# Patient Record
Sex: MALE
[De-identification: names, ages, dates, MRNs, and addresses within clinical notes are randomized per-mention and may not be internally consistent; named-entity substitution may affect disease eponyms.]

---

## 2019-01-30 PROBLEM — Z00.00 ENCOUNTER FOR PREVENTIVE HEALTH EXAMINATION: Status: ACTIVE | Noted: 2019-01-30

## 2019-02-07 ENCOUNTER — APPOINTMENT (OUTPATIENT)
Dept: NEUROLOGY | Facility: CLINIC | Age: 49
End: 2019-02-07
Payer: COMMERCIAL

## 2019-02-07 VITALS
SYSTOLIC BLOOD PRESSURE: 135 MMHG | TEMPERATURE: 98.4 F | OXYGEN SATURATION: 96 % | WEIGHT: 200 LBS | HEIGHT: 73 IN | HEART RATE: 86 BPM | DIASTOLIC BLOOD PRESSURE: 88 MMHG | BODY MASS INDEX: 26.51 KG/M2

## 2019-02-07 DIAGNOSIS — R41.89 OTHER SYMPTOMS AND SIGNS INVOLVING COGNITIVE FUNCTIONS AND AWARENESS: ICD-10-CM

## 2019-02-07 DIAGNOSIS — F32.9 MAJOR DEPRESSIVE DISORDER, SINGLE EPISODE, UNSPECIFIED: ICD-10-CM

## 2019-02-07 DIAGNOSIS — Z86.69 PERSONAL HISTORY OF OTHER DISEASES OF THE NERVOUS SYSTEM AND SENSE ORGANS: ICD-10-CM

## 2019-02-07 DIAGNOSIS — G20 PARKINSON'S DISEASE: ICD-10-CM

## 2019-02-07 DIAGNOSIS — Z78.9 OTHER SPECIFIED HEALTH STATUS: ICD-10-CM

## 2019-02-07 PROCEDURE — 95972 ALYS CPLX SP/PN NPGT W/PRGRM: CPT

## 2019-02-07 PROCEDURE — 99204 OFFICE O/P NEW MOD 45 MIN: CPT | Mod: 25

## 2019-02-07 RX ORDER — ESCITALOPRAM OXALATE 20 MG/1
20 TABLET, FILM COATED ORAL DAILY
Refills: 0 | Status: ACTIVE | COMMUNITY
Start: 2019-02-07

## 2019-02-08 PROBLEM — G20 PARKINSON DISEASE: Status: ACTIVE | Noted: 2019-02-07

## 2019-02-08 PROBLEM — F32.9 DEPRESSION: Status: ACTIVE | Noted: 2019-02-07

## 2019-02-08 PROBLEM — R41.89 COGNITIVE IMPAIRMENT: Status: ACTIVE | Noted: 2019-02-07

## 2019-02-08 NOTE — DISCUSSION/SUMMARY
[FreeTextEntry1] : YOPD with b/l STN DBS who is mildly more bradykinetic today due to reductions in levodopa. Adjustments to DBS were well tolerated and beneficial. Will leave his meds unchanged. Discussed a trial of xadago in the future if his bradykinesia increases. \par \par RTC 4 - 6months

## 2019-02-08 NOTE — HISTORY OF PRESENT ILLNESS
[FreeTextEntry1] : Patient with YOPD s/p b.l STN DBS. He had RC units placed in 9/2017.  Since his last visit, he tapered down on sinemet to BID and started exelon TD. He feels that his thinking is clearer since starting exelon.  His speech has not worsened. He feels mildly slower with the medication changes, but has not had any falls. He occasionally has FOG. His wife reports mild tremors in his hands. Does not have LID. \par \par Nonmotor\par Sleep is fragmented which he relates to stress\par He does not exercise\par Denies any constipation\par \par PD meds\par sinemet 25/100 2 tabs BID\par exelon 4.5mg TD\par

## 2019-06-06 ENCOUNTER — APPOINTMENT (OUTPATIENT)
Dept: NEUROLOGY | Facility: CLINIC | Age: 49
End: 2019-06-06
Payer: COMMERCIAL

## 2019-06-06 VITALS
DIASTOLIC BLOOD PRESSURE: 75 MMHG | SYSTOLIC BLOOD PRESSURE: 108 MMHG | OXYGEN SATURATION: 96 % | TEMPERATURE: 98 F | HEART RATE: 89 BPM | BODY MASS INDEX: 27.96 KG/M2 | HEIGHT: 73 IN | WEIGHT: 211 LBS

## 2019-06-06 VITALS — DIASTOLIC BLOOD PRESSURE: 73 MMHG | HEART RATE: 91 BPM | SYSTOLIC BLOOD PRESSURE: 107 MMHG

## 2019-06-06 PROCEDURE — 99214 OFFICE O/P EST MOD 30 MIN: CPT | Mod: 25

## 2019-06-06 PROCEDURE — 95970 ALYS NPGT W/O PRGRMG: CPT

## 2019-06-06 RX ORDER — RIVASTIGMINE 4.6 MG/24H
4.6 PATCH, EXTENDED RELEASE TRANSDERMAL DAILY
Refills: 0 | Status: DISCONTINUED | COMMUNITY
Start: 2019-02-07 | End: 2019-06-06

## 2019-06-06 RX ORDER — SAFINAMIDE MESYLATE 100 MG/1
100 TABLET, FILM COATED ORAL DAILY
Qty: 30 | Refills: 3 | Status: DISCONTINUED | COMMUNITY
Start: 2019-02-07 | End: 2019-06-06

## 2019-06-06 RX ORDER — SAFINAMIDE MESYLATE 50 MG/1
50 TABLET, FILM COATED ORAL DAILY
Qty: 14 | Refills: 0 | Status: DISCONTINUED | COMMUNITY
Start: 2019-02-07 | End: 2019-06-06

## 2019-06-06 NOTE — PHYSICAL EXAM
[FreeTextEntry1] : There is 2+ masking. 2+ hypophonia with mild dysarthria. Tremors are absent. Rapid alternating movements are mildly slow R>L with normal tone. Walks with good SL and turns. There is no FOG or LID. Pull test is negative.

## 2019-06-06 NOTE — HISTORY OF PRESENT ILLNESS
[FreeTextEntry1] : Patient with YOPD s/p b.l STN DBS. Since his last visit, he feels that he is overall movements have been stable since the last adjustment.  He occasionally has some difficulty getting words out and feels slurred at times. Typing is normal.  He stopped the exelon patch 3 months ago due to drowsiness, which continue to bother him throughout the day. His first dose of sinemet triggers the drowsiness usually. Snores at night and has not been able to get a sleep study in the past due to lack of insurance covering it. Did not try xadago bc it was not covered. \par \par Nonmotor\par Sleep is fragmented which he relates to stress\par Denies any constipation\par \par PD meds\par sinemet 25/100 2 tabs AM and PM\par \par prior\par neupro - sleep attacks\par azilect

## 2019-06-06 NOTE — DISCUSSION/SUMMARY
[FreeTextEntry1] : PD with bilateral STN DBS who has daytime drowsiness and speech issues. \par \par Plan\par We decided to try selegiline 5mg BID\par reduce sinemet to 1-1.5tabs in AM and leave evening dose the same. Will consider adding 1 tab sinemet at noon. In the past, levodopa tended to worsen his dysarthria. \par Leave DBS unchanged\par Consider sleep study \par \par RTO 3-4 months

## 2019-06-20 ENCOUNTER — OTHER (OUTPATIENT)
Age: 49
End: 2019-06-20

## 2019-06-20 DIAGNOSIS — R53.83 OTHER FATIGUE: ICD-10-CM

## 2019-09-12 ENCOUNTER — APPOINTMENT (OUTPATIENT)
Dept: NEUROLOGY | Facility: CLINIC | Age: 49
End: 2019-09-12
Payer: COMMERCIAL

## 2019-09-12 PROCEDURE — 95983 ALYS BRN NPGT PRGRMG 15 MIN: CPT

## 2019-09-12 PROCEDURE — 99214 OFFICE O/P EST MOD 30 MIN: CPT

## 2019-09-12 RX ORDER — SELEGILINE HYDROCHLORIDE 5 MG/1
5 CAPSULE ORAL TWICE DAILY
Qty: 180 | Refills: 3 | Status: DISCONTINUED | COMMUNITY
Start: 2019-06-06 | End: 2019-09-12

## 2019-09-13 NOTE — PHYSICAL EXAM
[FreeTextEntry1] : There is 2+ masking. Speech is mildly hypophonic but fluent. EOMI. There were no rest tremors. His rapid movements decrements 1-2+ with  trace rigidity at the wrists. He arises easily and walks with slightly stooped posture. His gait is mildly slow. Turns well. recovers on pull test

## 2019-09-13 NOTE — DISCUSSION/SUMMARY
[FreeTextEntry1] : Patient with increased off time with levodopa induced speech changes. \par Daytime fatigue and brain fog present\par \par Plan\par DBS adjusted\par start xadago 50 -->100mg qd over 2 weeks\par trial of parcopa 1 tab q4hrs in place of sinemet IR. Suggested that he move his protein content to later in the day as it may be impacting sinemet kinetics. \par Will consider trial of low dose ritallin in the future\par \par he will contact me in 2-3 weeks.

## 2019-09-13 NOTE — HISTORY OF PRESENT ILLNESS
[FreeTextEntry1] : Patient with YOPD s/p b.l STN DBS. He has been walking more slowly and shuffling. He is stooped as well per his wife. He has a a few soft falls and notices occasional freezing episodes when walking through doorways. He is taking sinemet 2 tabs TID (~6hrs apart). He has some wearing offs but says that his speech can be unintelligible at times. He also feel that modafinil was not helpful for his brain fog that occurs daily. He finds that when he takes sinemet q4hrs the benefits are not realized for several days. He admits to having a meat centric diet with protein ingested for all 3 meals. \par \par \par Nonmotor\par Sleep is fragmented which he relates to stress\par Denies any constipation\par \par PD meds\par sinemet 25/100 2 tabs TID\par modafinil 200mg qAM\par \par prior\par neupro - sleep attacks\par azilect\par selegiline

## 2019-09-18 ENCOUNTER — RX RENEWAL (OUTPATIENT)
Age: 49
End: 2019-09-18

## 2019-09-18 RX ORDER — CARBIDOPA AND LEVODOPA 25; 100 MG/1; MG/1
25-100 TABLET, ORALLY DISINTEGRATING ORAL
Qty: 120 | Refills: 3 | Status: ACTIVE | COMMUNITY
Start: 2019-09-12 | End: 1900-01-01

## 2019-10-07 ENCOUNTER — OTHER (OUTPATIENT)
Age: 49
End: 2019-10-07

## 2019-12-02 ENCOUNTER — RX RENEWAL (OUTPATIENT)
Age: 49
End: 2019-12-02

## 2019-12-17 ENCOUNTER — RX RENEWAL (OUTPATIENT)
Age: 49
End: 2019-12-17

## 2020-02-06 ENCOUNTER — APPOINTMENT (OUTPATIENT)
Dept: NEUROLOGY | Facility: CLINIC | Age: 50
End: 2020-02-06
Payer: COMMERCIAL

## 2020-02-06 ENCOUNTER — APPOINTMENT (OUTPATIENT)
Dept: NEUROLOGY | Facility: CLINIC | Age: 50
End: 2020-02-06

## 2020-02-06 PROCEDURE — 99214 OFFICE O/P EST MOD 30 MIN: CPT | Mod: 25

## 2020-02-06 PROCEDURE — 95983 ALYS BRN NPGT PRGRMG 15 MIN: CPT

## 2020-02-07 NOTE — HISTORY OF PRESENT ILLNESS
[FreeTextEntry1] : Patient with YOPD s/p b.l STN DBS. He reports that speech continues to be difficult. It improves in the morning and progressively worsens as the day goes on. He is slouching more and has had several near falls and freezing episodes. The freezing occurs as her crosses doorways. \par \par Modafinil has helped his daytime fatigue, but brain fog continues to occur throughout the day. \par \par Nonmotor Sxms: \par Sleep is fragmented which he relates to stress\par Denies any constipation\par \par PD meds\par sinemet 25/100 2 tabs BID\par modafinil 200mg qAM\par \par prior\par neupro - sleep attacks\par azilect\par selegiline\par xadago\par rytary \par exelon

## 2020-02-07 NOTE — DISCUSSION/SUMMARY
[FreeTextEntry1] : Patient with increased bradykinesia responsive to DBS changes. Speech remains difficult throughout the day\par Daytime fatigue is better but brain fog has not remitted\par \par Plan\par DBS adjusted\par Will trial of nourianz 20mg x 2weeks and if no response then will increase to 40mg \par leave sinemet regimen unchanged\par d/c modafinil and do trial of ritallin 5mg qd\par increase exercising\par \par RTO 3months\par

## 2020-02-07 NOTE — PHYSICAL EXAM
[FreeTextEntry1] : There is 2+ masking. Speech is mildly hypophonic but fluent. ?lower cranial dystonia vs. stiffness of his jaw when speaking.  EOMI. There were no rest tremors. His rapid movements decrements 2+ with mild rigidity at the wrists. He arises easily and walks with slightly stooped posture. His gait is mildly slow. Turns well. recovers on pull test

## 2020-02-07 NOTE — PROCEDURE
[FreeTextEntry1] : Deep Brain Stimulation Programming: Refer to scanned form(s)\par \par R IPG: Lower amplitude improved bradykinesia\par LIPG: Higher amplitude improved bradykinesia\par \par Gait was overall better after stim changes\par \par Programming Time: 10mins

## 2020-04-07 RX ORDER — MODAFINIL 200 MG/1
200 TABLET ORAL EVERY MORNING
Qty: 30 | Refills: 3 | Status: DISCONTINUED | COMMUNITY
Start: 2019-06-20 | End: 2020-04-07

## 2020-04-07 RX ORDER — SAFINAMIDE MESYLATE 50 MG/1
50 TABLET, FILM COATED ORAL DAILY
Qty: 14 | Refills: 0 | Status: DISCONTINUED | COMMUNITY
Start: 2019-09-13 | End: 2020-04-07

## 2020-04-07 RX ORDER — ISTRADEFYLLINE 40 MG/1
40 TABLET, FILM COATED ORAL
Qty: 30 | Refills: 3 | Status: ACTIVE | COMMUNITY
Start: 2020-04-07 | End: 1900-01-01

## 2020-04-07 RX ORDER — SAFINAMIDE MESYLATE 100 MG/1
100 TABLET, FILM COATED ORAL
Qty: 30 | Refills: 5 | Status: DISCONTINUED | COMMUNITY
Start: 2019-09-12 | End: 2020-04-07

## 2020-04-08 ENCOUNTER — RX RENEWAL (OUTPATIENT)
Age: 50
End: 2020-04-08

## 2020-06-18 RX ORDER — MODAFINIL 200 MG/1
200 TABLET ORAL
Qty: 30 | Refills: 0 | Status: ACTIVE | COMMUNITY
Start: 2020-06-18 | End: 1900-01-01

## 2021-01-31 ENCOUNTER — TRANSCRIPTION ENCOUNTER (OUTPATIENT)
Age: 51
End: 2021-01-31

## 2021-02-11 RX ORDER — METHYLPHENIDATE HYDROCHLORIDE 5 MG/1
5 TABLET ORAL TWICE DAILY
Qty: 60 | Refills: 0 | Status: ACTIVE | COMMUNITY
Start: 2020-02-06 | End: 1900-01-01

## 2021-03-03 ENCOUNTER — RX RENEWAL (OUTPATIENT)
Age: 51
End: 2021-03-03

## 2021-03-03 RX ORDER — CARBIDOPA AND LEVODOPA 25; 100 MG/1; MG/1
25-100 TABLET ORAL 4 TIMES DAILY
Qty: 360 | Refills: 3 | Status: ACTIVE | COMMUNITY
Start: 2019-12-17 | End: 1900-01-01

## 2023-10-31 NOTE — PHYSICAL EXAM
"PRIMARY DIAGNOSIS: \"GENERIC\" NURSING  OUTPATIENT/OBSERVATION GOALS TO BE MET BEFORE DISCHARGE:  ADLs back to baseline: Yes    Activity and level of assistance: Ambulating independently.    Pain status: Pain free.    Return to near baseline physical activity: Yes     Discharge Planner Nurse   Safe discharge environment identified: Yes  Barriers to discharge: Yes       Entered by: Val Sims RN 10/31/2023 4:39 AM     Please review provider order for any additional goals.   Nurse to notify provider when observation goals have been met and patient is ready for discharge.    Problem: Comorbidity Management  Goal: Blood Glucose Levels Within Targeted Range  Outcome: Progressing  Intervention: Monitor and Manage Glycemia  Recent Flowsheet Documentation  Taken 10/31/2023 0106 by Val Sims RN  Medication Review/Management: medications reviewed     Problem: Adult Inpatient Plan of Care  Goal: Plan of Care Review  Outcome: Progressing   Goal Outcome Evaluation:  Patient A&O x4, able to make needs known. VSS on RA, elevated BPs. Denied pain throughout shift. Left PIV infusing D5 with NS at 75mL/hr. No complaints of nausea and vomiting. No tenderness upon abdominal palpation. Blood sugar checks q4h. Blood sugar of 61 at 0615, 25mL dextrose given. 15 min recheck was 156. K protocol, recheck in the morning. NPO. Independent with activity. Discharge pending.     " [General Appearance - Alert] : alert [General Appearance - In No Acute Distress] : in no acute distress [General Appearance - Well Nourished] : well nourished [General Appearance - Well-Appearing] : healthy appearing [Affect] : the affect was normal [Cranial Nerves Oculomotor (III)] : extraocular motion intact [Cranial Nerves Facial (VII)] : face symmetrical [Involuntary Movements] : no involuntary movements were seen [Sensation Tactile Decrease] : light touch was intact [FreeTextEntry1] : There is 2+ masking. Speech is mildly hypophonic but fluent. EOMI. There were no rest tremors. His rapid movements decrements 2+ with mild rigidity at the wrists. He arises easily and walks with slightly stooped posture. His gait is mildly slow. Turns well. recovers on pull test [FreeTextEntry8] : see movement exam